# Patient Record
Sex: MALE | Race: WHITE | NOT HISPANIC OR LATINO | Employment: OTHER | URBAN - METROPOLITAN AREA
[De-identification: names, ages, dates, MRNs, and addresses within clinical notes are randomized per-mention and may not be internally consistent; named-entity substitution may affect disease eponyms.]

---

## 2018-01-10 ENCOUNTER — OFFICE VISIT CONVERTED (OUTPATIENT)
Dept: ORTHOPEDIC SURGERY | Facility: CLINIC | Age: 48
End: 2018-01-10
Attending: PHYSICIAN ASSISTANT

## 2018-10-29 ENCOUNTER — OFFICE VISIT CONVERTED (OUTPATIENT)
Dept: PODIATRY | Facility: CLINIC | Age: 48
End: 2018-10-29
Attending: PODIATRIST

## 2019-03-07 ENCOUNTER — HOSPITAL ENCOUNTER (OUTPATIENT)
Dept: GASTROENTEROLOGY | Facility: HOSPITAL | Age: 49
Setting detail: HOSPITAL OUTPATIENT SURGERY
Discharge: HOME | End: 2019-03-07
Attending: INTERNAL MEDICINE

## 2019-03-07 LAB — GLUCOSE BLD-MCNC: 156 MG/DL (ref 70–99)

## 2020-08-17 ENCOUNTER — HOSPITAL ENCOUNTER (OUTPATIENT)
Dept: CARDIOLOGY | Facility: HOSPITAL | Age: 50
Discharge: HOME | End: 2020-08-17

## 2021-05-16 VITALS
WEIGHT: 202 LBS | HEART RATE: 82 BPM | OXYGEN SATURATION: 98 % | BODY MASS INDEX: 29.92 KG/M2 | HEIGHT: 69 IN | SYSTOLIC BLOOD PRESSURE: 127 MMHG | DIASTOLIC BLOOD PRESSURE: 83 MMHG

## 2021-05-16 VITALS — WEIGHT: 185 LBS | OXYGEN SATURATION: 98 % | HEIGHT: 70 IN | BODY MASS INDEX: 26.48 KG/M2

## 2022-10-19 ENCOUNTER — OFFICE VISIT (OUTPATIENT)
Dept: OTOLARYNGOLOGY | Facility: CLINIC | Age: 52
End: 2022-10-19

## 2022-10-19 VITALS — BODY MASS INDEX: 29.56 KG/M2 | WEIGHT: 199.6 LBS | TEMPERATURE: 97.6 F | HEIGHT: 69 IN

## 2022-10-19 DIAGNOSIS — H93.12 TINNITUS OF LEFT EAR: ICD-10-CM

## 2022-10-19 DIAGNOSIS — H61.21 IMPACTED CERUMEN OF RIGHT EAR: Primary | ICD-10-CM

## 2022-10-19 DIAGNOSIS — H91.90 HEARING LOSS, UNSPECIFIED HEARING LOSS TYPE, UNSPECIFIED LATERALITY: ICD-10-CM

## 2022-10-19 PROCEDURE — 69210 REMOVE IMPACTED EAR WAX UNI: CPT | Performed by: NURSE PRACTITIONER

## 2022-10-19 PROCEDURE — 99213 OFFICE O/P EST LOW 20 MIN: CPT | Performed by: NURSE PRACTITIONER

## 2022-10-19 RX ORDER — SULFAMETHOXAZOLE AND TRIMETHOPRIM 800; 160 MG/1; MG/1
TABLET ORAL
COMMUNITY
End: 2022-10-19

## 2022-10-19 NOTE — PROGRESS NOTES
Patient Name: Fady Dwyer   Visit Date: 10/19/2022   Patient ID: [id number]  Provider: CLAUDIA Schreiber    Sex: male  Location: [facility]   YOB: 1970  Location Address: [address] [place] [address], [address] [place] [address],?[place]?[id number]    Primary Care Provider Deidre Balderas APRN  Location Phone: [phone]    Referring Provider: CLAUDIA Vizcarra        Chief Complaint  Earache, Hearing Loss (New patient ), and Cerumen Impaction    Subjective   Fady Dwyer is a 52 y.o. male who presents to [facility] today as a consult from CLAUDIA Vizcarra for evaluation of his ears.  He states for the last 6 to 9 months he has had issues with cerumen buildup and difficulty hearing.  He states he has been using Debrox solution at home and an earwax removal kit.  He states he does feel like he is actually hearing a little bit better and he was able to get some wax out of his ears.  He states he has had left-sided tinnitus on and off for quite some time but states he feels like his right ear is his worst hearing ear.  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      Current Outpatient Medications on File Prior to Visit   Medication Sig   • amitriptyline (ELAVIL) 25 MG tablet amitriptyline 25 mg tablet   Take 1 tablet every day by oral route for 30 days.   • atenolol (TENORMIN) 50 MG tablet atenolol 50 mg tablet   TAKE 1 TABLET BY MOUTH EVERY DAY   • glipizide (GLUCOTROL) 10 MG tablet glipizide 10 mg tablet   TAKE 1 TABLET BY MOUTH EVERY DAY   • oxyCODONE-acetaminophen (PERCOCET) 7.5-325 MG per tablet Percocet 7.5 mg-325 mg tablet   TAKE 1 TABLET BY MOUTH THREE TIMES A DAY AS NEEDED FOR 30 DAYS   • sulfamethoxazole-trimethoprim (BACTRIM DS,SEPTRA DS) 800-160 MG per tablet sulfamethoxazole 800 mg-trimethoprim 160 mg tablet   TAKE 1 TABLET BY MOUTH TWO TIMES DAILY FOR 10 DAYS     No current  "facility-administered medications on file prior to visit.        Social History     Tobacco Use   • Smoking status: Every Day     Packs/day: 1.00     Years: 3.00     Pack years: 3.00     Types: Cigarettes   • Smokeless tobacco: Never   Vaping Use   • Vaping Use: Never used   Substance Use Topics   • Alcohol use: Not Currently     Comment: May drink a beer or a glass of wine 2 to 3 times a year.   • Drug use: Never       Objective     Vital Signs:   Temp 97.6 °F (36.4 °C) (Tympanic)   Ht 175.3 cm (69\")   Wt 90.5 kg (199 lb 9.6 oz)   BMI 29.48 kg/m²       Physical Exam  Constitutional:       General: He is not in acute distress.     Appearance: Normal appearance. He is not ill-appearing.   HENT:      Head: Normocephalic and atraumatic.      Jaw: There is normal jaw occlusion. No tenderness or pain on movement.      Salivary Glands: Right salivary gland is not diffusely enlarged or tender. Left salivary gland is not diffusely enlarged or tender.      Right Ear: Tympanic membrane, ear canal and external ear normal. There is impacted cerumen.      Left Ear: Tympanic membrane, ear canal and external ear normal.      Ears:      [place] exam findings: lateralizes right.     Right Rinne: AC > BC.     Left Rinne: AC > BC.     Nose: Nose normal. No septal deviation.      Right Sinus: No maxillary sinus tenderness or frontal sinus tenderness.      Left Sinus: No maxillary sinus tenderness or frontal sinus tenderness.      Mouth/Throat:      Lips: Pink. No lesions.      Mouth: Mucous membranes are moist. No oral lesions.      Dentition: Normal dentition.      Tongue: No lesions.      Palate: No mass and lesions.      Pharynx: Oropharynx is clear. Uvula midline.      Tonsils: No tonsillar exudate.   Eyes:      Extraocular Movements: Extraocular movements intact.      Conjunctiva/sclera: Conjunctivae normal.      Pupils: Pupils are equal, round, and reactive to light.   Neck:      Thyroid: No thyroid mass, thyromegaly or thyroid " tenderness.      Trachea: Trachea normal.   Pulmonary:      Effort: Pulmonary effort is normal. No respiratory distress.   Musculoskeletal:         General: Normal range of motion.      Cervical back: Normal range of motion and neck supple. No tenderness.   Lymphadenopathy:      Cervical: No cervical adenopathy.   Skin:     General: Skin is warm and dry.   Neurological:      General: No focal deficit present.      Mental Status: He is alert and oriented to person, place, and time.      Cranial Nerves: No cranial nerve deficit.      Motor: No weakness.      Gait: Gait normal.   Psychiatric:         Mood and Affect: Mood normal.         Behavior: Behavior normal.         Thought Content: Thought content normal.         Judgment: Judgment normal.         Ear Cerumen Removal    Date/Time: 10/19/2022 9:55 AM  Performed by: Vaishali Pineda APRN  Authorized by: Vaishali Pineda APRN     Anesthesia:  Local Anesthetic: none  Location details: right ear  Patient tolerance: patient tolerated the procedure well with no immediate complications  Procedure type: instrumentation, suction   Sedation:  Patient sedated: no             Result Review :              Assessment and Plan    Diagnoses and all orders for this visit:    1. Impacted cerumen of right ear (Primary)    2. Tinnitus of left ear    3. Hearing loss, unspecified hearing loss type, unspecified laterality    Other orders  -     Ear Cerumen Removal    On examination today he does have some cerumen present in the right ear which I was able to clear.  Tuning fork testing suggested a sensorineural hearing loss of the left ear which is the ear he is having tinnitus in.  I would like to get him set up for an audiogram and tympanogram testing and see him back afterwards for follow-up.       Follow Up   No follow-ups on file.  Patient was given instructions and counseling regarding his condition or for health maintenance advice. Please see specific information pulled into the  AVS if appropriate.      Vaishali Pineda, APRN

## 2022-11-23 ENCOUNTER — OFFICE VISIT (OUTPATIENT)
Dept: OTOLARYNGOLOGY | Facility: CLINIC | Age: 52
End: 2022-11-23

## 2022-11-23 VITALS — WEIGHT: 197.4 LBS | HEIGHT: 69 IN | TEMPERATURE: 97.3 F | BODY MASS INDEX: 29.24 KG/M2

## 2022-11-23 DIAGNOSIS — H69.83 DYSFUNCTION OF BOTH EUSTACHIAN TUBES: ICD-10-CM

## 2022-11-23 DIAGNOSIS — H90.6 MIXED HEARING LOSS, BILATERAL: Primary | ICD-10-CM

## 2022-11-23 DIAGNOSIS — H93.13 TINNITUS OF BOTH EARS: ICD-10-CM

## 2022-11-23 PROCEDURE — 99213 OFFICE O/P EST LOW 20 MIN: CPT | Performed by: NURSE PRACTITIONER

## 2022-11-23 RX ORDER — AMITRIPTYLINE HYDROCHLORIDE 50 MG/1
50 TABLET, FILM COATED ORAL
COMMUNITY
Start: 2022-10-24

## 2022-11-23 RX ORDER — EZETIMIBE 10 MG/1
TABLET ORAL
COMMUNITY
Start: 2022-11-22

## 2022-11-23 NOTE — PROGRESS NOTES
Patient Name: Fady Dwyer   Visit Date: 11/23/2022   Patient ID: [id number]  Provider: CLAUDIA Schreiber    Sex: male  Location: [facility]   YOB: 1970  Location Address: [address] [place] [address], [address],?[place]?[id number]    Primary Care Provider Deidre Balderas APRN  Location Phone: [phone]    Referring Provider: No ref. provider found        Chief Complaint  Follow-up Audio Results    Subjective          Fady Dwyer is a 52 y.o. male who presents to [facility] for a follow-up visit of his ears and hearing loss.  He presents today for follow-up after having an audiogram and tympanogram testing.  He reports a decline in his hearing especially on the right side over the past 10 years.  He states that this is really worsened over the past 9 months or so.  He states he also has tinnitus bilaterally from time to time and issues with cerumen buildup.  We were able to clear his ears of cerumen at his last visit.  His audiogram that was performed at the [facility] hearing clinic on 11/21/2022 shows the right ear with a severe rising to moderate mixed loss in the left ear with normal hearing sloping to a moderate and then rising to a mild mixed loss.  Speech reception threshold was at 35 dB in the left ear and 85 dB in the right ear.  Word discrimination scores were 100% in the left ear at 75 dB and 88% in the right ear at 70 dB masked.  The right tympanogram was essentially flat and the left tympanogram showed negative pressure.     Current Outpatient Medications on File Prior to Visit   Medication Sig   • atenolol (TENORMIN) 50 MG tablet atenolol 50 mg tablet   TAKE 1 TABLET BY MOUTH EVERY DAY   • glipizide (GLUCOTROL) 10 MG tablet glipizide 10 mg tablet   TAKE 1 TABLET BY MOUTH EVERY DAY   • oxyCODONE-acetaminophen (PERCOCET) 7.5-325 MG per tablet Percocet 7.5 mg-325 mg tablet   TAKE 1 TABLET BY MOUTH THREE TIMES A DAY AS  "NEEDED FOR 30 DAYS   • amitriptyline (ELAVIL) 25 MG tablet amitriptyline 25 mg tablet   Take 1 tablet every day by oral route for 30 days.   • amitriptyline (ELAVIL) 50 MG tablet Take 50 mg by mouth every night at bedtime.   • ezetimibe (ZETIA) 10 MG tablet      No current facility-administered medications on file prior to visit.        Social History     Tobacco Use   • Smoking status: Every Day     Packs/day: 1.00     Years: 3.00     Pack years: 3.00     Types: Cigarettes   • Smokeless tobacco: Never   Vaping Use   • Vaping Use: Never used   Substance Use Topics   • Alcohol use: Not Currently     Comment: May drink a beer or a glass of wine 2 to 3 times a year.   • Drug use: Never        Objective     Vital Signs:   Temp 97.3 °F (36.3 °C) (Temporal)   Ht 175.3 cm (69\")   Wt 89.5 kg (197 lb 6.4 oz)   BMI 29.15 kg/m²       Physical Exam  Constitutional:       General: He is not in acute distress.     Appearance: Normal appearance. He is not ill-appearing.   HENT:      Head: Normocephalic and atraumatic.      Jaw: There is normal jaw occlusion. No tenderness or pain on movement.      Salivary Glands: Right salivary gland is not diffusely enlarged or tender. Left salivary gland is not diffusely enlarged or tender.      Right Ear: Ear canal and external ear normal. Tympanic membrane is retracted.      Left Ear: Tympanic membrane, ear canal and external ear normal.      Nose: Nose normal. No septal deviation.      Right Sinus: No maxillary sinus tenderness or frontal sinus tenderness.      Left Sinus: No maxillary sinus tenderness or frontal sinus tenderness.      Mouth/Throat:      Lips: Pink. No lesions.      Mouth: Mucous membranes are moist. No oral lesions.      Dentition: Normal dentition.      Tongue: No lesions.      Palate: No mass and lesions.      Pharynx: Oropharynx is clear. Uvula midline.      Tonsils: No tonsillar exudate.   Eyes:      Extraocular Movements: Extraocular movements intact.      " Conjunctiva/sclera: Conjunctivae normal.      Pupils: Pupils are equal, round, and reactive to light.   Neck:      Thyroid: No thyroid mass, thyromegaly or thyroid tenderness.      Trachea: Trachea normal.   Pulmonary:      Effort: Pulmonary effort is normal. No respiratory distress.   Musculoskeletal:         General: Normal range of motion.      Cervical back: Normal range of motion and neck supple. No tenderness.   Lymphadenopathy:      Cervical: No cervical adenopathy.   Skin:     General: Skin is warm and dry.   Neurological:      General: No focal deficit present.      Mental Status: He is alert and oriented to person, place, and time.      Cranial Nerves: No cranial nerve deficit.      Motor: No weakness.      Gait: Gait normal.   Psychiatric:         Mood and Affect: Mood normal.         Behavior: Behavior normal.         Thought Content: Thought content normal.         Judgment: Judgment normal.                Result Review :               Assessment and Plan    Diagnoses and all orders for this visit:    1. Mixed hearing loss, bilateral (Primary)    2. Dysfunction of both eustachian tubes    3. Tinnitus of both ears    I was able to review the results of his audiogram today.  He does have significant conductive component to his hearing loss especially on the right side with a tympanogram that showed essentially flat on the right and negative pressure on the left.  On examination his right ear looks significantly retracted.  We have discussed treatment options and I would like for him to schedule an appointment to meet with Dr. Henry to discuss of tube placement would be the right option for him.  We discussed that he does have some component of sensorineural hearing loss and his tinnitus symptoms could be related to this.  I will have him follow-up with Dr. Henry in a few weeks for further assessment and possible tube placement.       Follow Up   No follow-ups on file.  Patient was given instructions and  counseling regarding his condition or for health maintenance advice. Please see specific information pulled into the AVS if appropriate.     CLAUDIA Schreiber